# Patient Record
Sex: FEMALE | Employment: FULL TIME
[De-identification: names, ages, dates, MRNs, and addresses within clinical notes are randomized per-mention and may not be internally consistent; named-entity substitution may affect disease eponyms.]

---

## 2023-05-03 DIAGNOSIS — E11.9 TYPE 2 DIABETES MELLITUS WITHOUT COMPLICATION, WITHOUT LONG-TERM CURRENT USE OF INSULIN (HCC): ICD-10-CM

## 2023-05-04 RX ORDER — SITAGLIPTIN AND METFORMIN HYDROCHLORIDE 500; 50 MG/1; MG/1
1 TABLET, FILM COATED ORAL 2 TIMES DAILY WITH MEALS
Qty: 60 TABLET | Refills: 0 | Status: SHIPPED | OUTPATIENT
Start: 2023-05-04 | End: 2023-05-24 | Stop reason: SDUPTHER

## 2023-05-24 ENCOUNTER — OFFICE VISIT (OUTPATIENT)
Facility: CLINIC | Age: 33
End: 2023-05-24
Payer: COMMERCIAL

## 2023-05-24 VITALS
OXYGEN SATURATION: 100 % | RESPIRATION RATE: 16 BRPM | WEIGHT: 272.4 LBS | HEIGHT: 66 IN | BODY MASS INDEX: 43.78 KG/M2 | SYSTOLIC BLOOD PRESSURE: 131 MMHG | DIASTOLIC BLOOD PRESSURE: 82 MMHG | TEMPERATURE: 97.2 F | HEART RATE: 101 BPM

## 2023-05-24 DIAGNOSIS — Z11.59 ENCOUNTER FOR HEPATITIS C SCREENING TEST FOR LOW RISK PATIENT: ICD-10-CM

## 2023-05-24 DIAGNOSIS — Z13.0 SCREENING FOR IRON DEFICIENCY ANEMIA: Primary | ICD-10-CM

## 2023-05-24 DIAGNOSIS — Z23 ENCOUNTER FOR VACCINATION: ICD-10-CM

## 2023-05-24 DIAGNOSIS — J03.91 RECURRENT TONSILLITIS: ICD-10-CM

## 2023-05-24 DIAGNOSIS — E11.9 ENCOUNTER FOR DIABETIC FOOT EXAM (HCC): ICD-10-CM

## 2023-05-24 DIAGNOSIS — E66.01 OBESITY, CLASS III, BMI 40-49.9 (MORBID OBESITY) (HCC): ICD-10-CM

## 2023-05-24 DIAGNOSIS — Z11.4 SCREENING FOR HIV (HUMAN IMMUNODEFICIENCY VIRUS): ICD-10-CM

## 2023-05-24 DIAGNOSIS — J30.9 ALLERGIC RHINITIS, UNSPECIFIED SEASONALITY, UNSPECIFIED TRIGGER: ICD-10-CM

## 2023-05-24 DIAGNOSIS — E11.9 TYPE 2 DIABETES MELLITUS WITHOUT COMPLICATION, WITHOUT LONG-TERM CURRENT USE OF INSULIN (HCC): ICD-10-CM

## 2023-05-24 PROCEDURE — 90677 PCV20 VACCINE IM: CPT | Performed by: INTERNAL MEDICINE

## 2023-05-24 PROCEDURE — 99214 OFFICE O/P EST MOD 30 MIN: CPT | Performed by: INTERNAL MEDICINE

## 2023-05-24 PROCEDURE — 3044F HG A1C LEVEL LT 7.0%: CPT | Performed by: INTERNAL MEDICINE

## 2023-05-24 PROCEDURE — 90715 TDAP VACCINE 7 YRS/> IM: CPT | Performed by: INTERNAL MEDICINE

## 2023-05-24 PROCEDURE — 90471 IMMUNIZATION ADMIN: CPT | Performed by: INTERNAL MEDICINE

## 2023-05-24 PROCEDURE — 90472 IMMUNIZATION ADMIN EACH ADD: CPT | Performed by: INTERNAL MEDICINE

## 2023-05-24 RX ORDER — SITAGLIPTIN AND METFORMIN HYDROCHLORIDE 500; 50 MG/1; MG/1
1 TABLET, FILM COATED ORAL 2 TIMES DAILY WITH MEALS
Qty: 180 TABLET | Refills: 3 | Status: SHIPPED | OUTPATIENT
Start: 2023-05-24

## 2023-05-24 RX ORDER — CETIRIZINE HYDROCHLORIDE 10 MG/1
10 TABLET ORAL DAILY
Qty: 30 TABLET | Refills: 0 | Status: SHIPPED | OUTPATIENT
Start: 2023-05-24 | End: 2023-06-23

## 2023-05-24 ASSESSMENT — ENCOUNTER SYMPTOMS
CONSTIPATION: 0
ABDOMINAL PAIN: 0
COUGH: 0
DIARRHEA: 0
SHORTNESS OF BREATH: 0

## 2023-05-24 NOTE — PROGRESS NOTES
Rex Padgett is a 33 y.o.  female presents today for office visit for   Chief Complaint   Patient presents with    Health Maintenance     1. \"Have you been to the ER, urgent care clinic since your last visit? Hospitalized since your last visit? \" No    2. \"Have you seen or consulted any other health care providers outside of the [address] [place] [address] since your last visit? \" No     3. For patients aged 39-70: Has the patient had a colonoscopy / FIT/ Cologuard? N/A     If the patient is female:    4. For patients aged 41-77: Has the patient had a mammogram within the past 2 years? N/A    5. For patients aged 21-65: Has the patient had a pap smear?  No

## 2023-05-24 NOTE — PROGRESS NOTES
Subjective:      Patient ID: Boyd Addison is a 33 y.o. female. Follow-up    Patient is coming back for diabetes follow-up, she was recently started treatment since she did not tolerate metformin alone. She is tolerating very well Janumet without nausea vomiting or abdominal pain, no diarrhea either. Blood sugars at home are controlled 80s to 100. She lost 2 pounds, she does not skip meals, she has no lightheadedness, dizziness or low energy. We will continue same treatment. Diabetes mellitus type 2  Metformin causing diarrhea. Will start Janumet trial for 1 month and increase as tolerated. Essential hypertension  Lisinopril 10 mg controlled. Obesity  BMI 44. We will explore medications to her losing weight and control diabetes better. Tolerating Janumet. Lost 2 pounds. PAST MEDICAL HISTORY  Medical:  as listed. Surgical: denied. Allergies: denied. Medication: as listed. Family: mom DM-II. Dad DM-II. Work:  and golf course. Social: tobacco denied, [place] socially, recreational drugs denied. Sexual: single, no children, STI denied. Review of Systems   Constitutional:  Negative for chills and fever. HENT:  Negative for sneezing. Respiratory:  Negative for cough and shortness of breath. Cardiovascular:  Negative for chest pain and palpitations. Gastrointestinal:  Negative for abdominal pain, constipation and diarrhea. Objective:   Visit Vitals  /82   Pulse (!) 101   Temp 97.2 °F (36.2 °C) (Temporal)   Resp 16   Ht 5' 6\" (1.676 m)   Wt 272 lb 6.4 oz (123.6 kg)   SpO2 100%   BMI 43.97 kg/m²        Physical Exam  Constitutional:       Appearance: Normal appearance.    HENT:      Right Ear: Hearing, tympanic membrane, ear canal and external ear normal.      Left Ear: Hearing, tympanic membrane, ear canal and external ear normal.      Mouth/Throat:      Mouth: Mucous membranes are moist.   Eyes:      Pupils: Pupils are equal, round, and reactive to

## 2023-05-25 LAB
BASOPHILS # BLD: 1 % (ref 0–2)
BASOPHILS ABSOLUTE: 0.1 K/UL (ref 0–0.2)
CREATININE URINE: 150 MG/DL
EOSINOPHIL # BLD: 1 % (ref 0–6)
EOSINOPHILS ABSOLUTE: 0.1 K/UL (ref 0–0.5)
HCT VFR BLD CALC: 39.5 % (ref 35.1–46.5)
HEMOGLOBIN: 12.4 G/DL (ref 11.7–15.5)
HEPATITIS C ANTIBODY: NORMAL
HIV -1/0/2 AG/AB WITH REFLEX: NON REACTIVE
HIV INTERPRETATION: NORMAL
LYMPHOCYTES # BLD: 28 % (ref 20–45)
LYMPHOCYTES ABSOLUTE: 2.4 K/UL (ref 1–4.8)
MCH RBC QN AUTO: 28 PG (ref 26–34)
MCHC RBC AUTO-ENTMCNC: 31 G/DL (ref 31–36)
MCV RBC AUTO: 88 FL (ref 80–99)
MICROALB/CREAT RATIO (UG/MG CREAT.): 9.1 (ref 0–30)
MICROALBUMIN/CREAT 24H UR: 13.7 MG/L (ref 0.1–17)
MONOCYTES ABSOLUTE: 0.5 K/UL (ref 0.1–1)
MONOCYTES: 6 % (ref 3–12)
NEUTROPHILS ABSOLUTE: 5.4 K/UL (ref 1.8–7.7)
NEUTROPHILS: 63 % (ref 40–75)
PDW BLD-RTO: 14.1 % (ref 10–15.5)
PLATELET # BLD: 337 K/UL (ref 140–440)
PMV BLD AUTO: 11.5 FL (ref 9–13)
RBC: 4.5 M/UL (ref 3.8–5.2)
VITAMIN D 25-HYDROXY: 13.6 NG/ML (ref 32–100)
WBC: 8.6 K/UL (ref 4–11)

## 2023-05-27 DIAGNOSIS — E55.9 VITAMIN D DEFICIENCY: Primary | ICD-10-CM

## 2023-05-27 RX ORDER — MELATONIN
1000 DAILY
Qty: 90 TABLET | Refills: 3 | Status: SHIPPED | OUTPATIENT
Start: 2023-08-20

## 2023-05-27 RX ORDER — ERGOCALCIFEROL 1.25 MG/1
50000 CAPSULE ORAL WEEKLY
Qty: 12 CAPSULE | Refills: 0 | Status: SHIPPED | OUTPATIENT
Start: 2023-05-27 | End: 2023-08-13

## 2023-08-24 ENCOUNTER — OFFICE VISIT (OUTPATIENT)
Facility: CLINIC | Age: 33
End: 2023-08-24
Payer: COMMERCIAL

## 2023-08-24 VITALS
DIASTOLIC BLOOD PRESSURE: 83 MMHG | OXYGEN SATURATION: 98 % | RESPIRATION RATE: 18 BRPM | HEIGHT: 66 IN | HEART RATE: 117 BPM | WEIGHT: 268 LBS | BODY MASS INDEX: 43.07 KG/M2 | SYSTOLIC BLOOD PRESSURE: 138 MMHG

## 2023-08-24 DIAGNOSIS — Z12.4 SCREENING FOR MALIGNANT NEOPLASM OF CERVIX: Primary | ICD-10-CM

## 2023-08-24 DIAGNOSIS — J30.89 NON-SEASONAL ALLERGIC RHINITIS DUE TO OTHER ALLERGIC TRIGGER: ICD-10-CM

## 2023-08-24 DIAGNOSIS — I10 ESSENTIAL HYPERTENSION: ICD-10-CM

## 2023-08-24 DIAGNOSIS — E11.9 TYPE 2 DIABETES MELLITUS WITHOUT COMPLICATION, WITHOUT LONG-TERM CURRENT USE OF INSULIN (HCC): ICD-10-CM

## 2023-08-24 DIAGNOSIS — E66.01 OBESITY, CLASS III, BMI 40-49.9 (MORBID OBESITY) (HCC): ICD-10-CM

## 2023-08-24 PROCEDURE — 3075F SYST BP GE 130 - 139MM HG: CPT | Performed by: INTERNAL MEDICINE

## 2023-08-24 PROCEDURE — 3044F HG A1C LEVEL LT 7.0%: CPT | Performed by: INTERNAL MEDICINE

## 2023-08-24 PROCEDURE — 99213 OFFICE O/P EST LOW 20 MIN: CPT | Performed by: INTERNAL MEDICINE

## 2023-08-24 PROCEDURE — 3079F DIAST BP 80-89 MM HG: CPT | Performed by: INTERNAL MEDICINE

## 2023-08-24 RX ORDER — CETIRIZINE HYDROCHLORIDE 10 MG/1
10 TABLET ORAL DAILY
Qty: 30 TABLET | Refills: 1 | Status: SHIPPED | OUTPATIENT
Start: 2023-08-24 | End: 2023-10-23

## 2023-08-24 RX ORDER — FLUTICASONE PROPIONATE 50 MCG
1 SPRAY, SUSPENSION (ML) NASAL DAILY
Qty: 32 G | Refills: 1 | Status: SHIPPED | OUTPATIENT
Start: 2023-08-24

## 2023-08-24 ASSESSMENT — ENCOUNTER SYMPTOMS
CONSTIPATION: 0
DIARRHEA: 0
SHORTNESS OF BREATH: 0
COUGH: 0
ABDOMINAL PAIN: 0

## 2023-08-24 NOTE — PATIENT INSTRUCTIONS
[facility]  [address] [place] [address] Drive  [phone]

## 2023-08-24 NOTE — PROGRESS NOTES
ROOM # 10  Identified pt with two pt identifiers(name and ). Reviewed record in preparation for visit and have obtained necessary documentation. Chief Complaint   Patient presents with    Gynecologic Exam      Dorina Poole preferred language for health care discussion is english/other. Is the patient using any DME equipment during OV? no    Dorian Poole is due for:  Health Maintenance Due   Topic    COVID-19 Vaccine (1)    Varicella vaccine (1 of 2 - 2-dose childhood series)    Diabetic foot exam     Diabetic Alb to Cr ratio (uACR) test     Diabetic retinal exam     Hepatitis B vaccine (1 of 3 - Risk 3-dose series)    Cervical cancer screen     Flu vaccine (1)       Health Maintenance reviewed and discussed per provider  Please order/place referral if appropriate. 1. For patients aged 43-73: Has the patient had a colonoscopy? no  If the patient is female:    2. For patients aged 43-66: Has the patient had a mammogram within the past 2 years? no    3. For patients aged 21-65: Has the patient had a pap smear? no    Advance Directive:  1. Do you have an advance directive in place? Patient Reply:no    2. If not, would you like material regarding how to put one in place? Patient Reply:no    Coordination of Care:  1. Have you been to the ER, urgent care clinic since your last visit? Hospitalized since your last visit? Patient Reply:no    2. Have you seen or consulted any other health care providers outside of the [address] [place] [address] since your last visit? Include any pap smears or colon. Patient Reply:no    Patient is accompanied by  I have received verbal consent from Dorian Poole to discuss any/all medical information while they are present in the room.     Depression Screening:  PHQ-9  2023   Little interest or pleasure in doing things 0   Feeling down, depressed, or hopeless 0   PHQ-2 Score 0   PHQ-9 Total Score 0           Recent Travel Screening and Travel History documentation     Travel Screening

## 2023-08-26 LAB
CREATININE URINE: 328 MG/DL
MICROALB/CREAT RATIO (UG/MG CREAT.): 9.5 (ref 0–30)
MICROALBUMIN/CREAT 24H UR: 31 MG/L (ref 0.1–17)

## 2023-08-31 LAB
CHLAMYDIA TRACHOMATIS THINPREP: NEGATIVE
NEISSERIA GONORRHOEAE THINPREP: NEGATIVE
PAP IMAGE GUIDED: NORMAL
TRICHOMONAS NUC AMP-THIN PREP: NEGATIVE

## 2023-12-04 ENCOUNTER — OFFICE VISIT (OUTPATIENT)
Facility: CLINIC | Age: 33
End: 2023-12-04
Payer: COMMERCIAL

## 2023-12-04 VITALS
OXYGEN SATURATION: 100 % | RESPIRATION RATE: 18 BRPM | DIASTOLIC BLOOD PRESSURE: 82 MMHG | SYSTOLIC BLOOD PRESSURE: 135 MMHG | HEART RATE: 98 BPM | BODY MASS INDEX: 44.29 KG/M2 | WEIGHT: 275.6 LBS | HEIGHT: 66 IN | TEMPERATURE: 97.5 F

## 2023-12-04 DIAGNOSIS — E11.9 TYPE 2 DIABETES MELLITUS WITHOUT COMPLICATION, WITHOUT LONG-TERM CURRENT USE OF INSULIN (HCC): Primary | ICD-10-CM

## 2023-12-04 DIAGNOSIS — J03.91 RECURRENT TONSILLITIS: ICD-10-CM

## 2023-12-04 PROCEDURE — 99213 OFFICE O/P EST LOW 20 MIN: CPT | Performed by: INTERNAL MEDICINE

## 2023-12-04 PROCEDURE — 3044F HG A1C LEVEL LT 7.0%: CPT | Performed by: INTERNAL MEDICINE

## 2023-12-04 RX ORDER — CETIRIZINE HYDROCHLORIDE 10 MG/1
10 TABLET ORAL DAILY
COMMUNITY
Start: 2023-11-12

## 2023-12-04 RX ORDER — AMOXICILLIN AND CLAVULANATE POTASSIUM 875; 125 MG/1; MG/1
1 TABLET, FILM COATED ORAL 2 TIMES DAILY
COMMUNITY
Start: 2023-11-29

## 2023-12-04 ASSESSMENT — PATIENT HEALTH QUESTIONNAIRE - PHQ9
SUM OF ALL RESPONSES TO PHQ QUESTIONS 1-9: 0
1. LITTLE INTEREST OR PLEASURE IN DOING THINGS: 0
SUM OF ALL RESPONSES TO PHQ QUESTIONS 1-9: 0
SUM OF ALL RESPONSES TO PHQ QUESTIONS 1-9: 0
2. FEELING DOWN, DEPRESSED OR HOPELESS: 0
SUM OF ALL RESPONSES TO PHQ9 QUESTIONS 1 & 2: 0
SUM OF ALL RESPONSES TO PHQ QUESTIONS 1-9: 0

## 2023-12-04 ASSESSMENT — ENCOUNTER SYMPTOMS
SHORTNESS OF BREATH: 0
ABDOMINAL PAIN: 0

## 2023-12-04 NOTE — PROGRESS NOTES
1. \"Have you been to the ER, urgent care clinic since your last visit? Hospitalized since your last visit? \"  [facility]ill[facility]    2. \"Have you seen or consulted any other health care providers outside of the [address] [place] [address] since your last visit? \" No     3. For patients aged 43-73: Has the patient had a colonoscopy / FIT/ Cologuard? NA - based on age      If the patient is female:    4. For patients aged 43-66: Has the patient had a mammogram within the past 2 years? NA - based on age or sex      11. For patients aged 21-65: Has the patient had a pap smear?  Yes - no Care Gap present

## 2023-12-05 LAB
AVERAGE GLUCOSE: 125 MG/DL (ref 91–123)
HBA1C MFR BLD: 6 % (ref 4.8–5.6)

## 2023-12-29 DIAGNOSIS — J30.89 OTHER ALLERGIC RHINITIS: ICD-10-CM

## 2023-12-29 RX ORDER — IBUPROFEN 800 MG/1
TABLET ORAL
COMMUNITY
Start: 2023-11-29

## 2023-12-29 RX ORDER — CETIRIZINE HYDROCHLORIDE 10 MG/1
10 TABLET ORAL DAILY
Qty: 30 TABLET | Refills: 1 | Status: SHIPPED | OUTPATIENT
Start: 2023-12-29

## 2024-01-19 DIAGNOSIS — J30.89 NON-SEASONAL ALLERGIC RHINITIS DUE TO OTHER ALLERGIC TRIGGER: ICD-10-CM

## 2024-01-19 RX ORDER — FLUTICASONE PROPIONATE 50 MCG
1 SPRAY, SUSPENSION (ML) NASAL DAILY
Qty: 3 EACH | Refills: 5 | Status: SHIPPED | OUTPATIENT
Start: 2024-01-19

## 2024-01-19 NOTE — TELEPHONE ENCOUNTER
Last Appointment:  12/4/2023  Future Appointments   Date Time Provider Department Center   3/18/2024  8:30 AM Gianluca Guadalupe MD [facility]

## 2024-03-27 DIAGNOSIS — J30.89 OTHER ALLERGIC RHINITIS: ICD-10-CM

## 2024-03-27 RX ORDER — CETIRIZINE HYDROCHLORIDE 10 MG/1
10 TABLET ORAL DAILY PRN
Qty: 90 TABLET | Refills: 0 | Status: SHIPPED | OUTPATIENT
Start: 2024-03-27

## 2024-03-27 NOTE — TELEPHONE ENCOUNTER
Last Appointment:  12/4/2023  Future Appointments   Date Time Provider Department Center   4/25/2024  8:15 AM Gianluca Guadalupe MD [facility]

## 2024-04-07 DIAGNOSIS — I10 ESSENTIAL HYPERTENSION: ICD-10-CM

## 2024-04-08 RX ORDER — LISINOPRIL 10 MG/1
10 TABLET ORAL DAILY
Qty: 30 TABLET | Refills: 0 | Status: SHIPPED | OUTPATIENT
Start: 2024-04-08

## 2024-04-24 SDOH — ECONOMIC STABILITY: TRANSPORTATION INSECURITY
IN THE PAST 12 MONTHS, HAS LACK OF TRANSPORTATION KEPT YOU FROM MEETINGS, WORK, OR FROM GETTING THINGS NEEDED FOR DAILY LIVING?: NO

## 2024-04-24 SDOH — ECONOMIC STABILITY: FOOD INSECURITY: WITHIN THE PAST 12 MONTHS, YOU WORRIED THAT YOUR FOOD WOULD RUN OUT BEFORE YOU GOT MONEY TO BUY MORE.: PATIENT DECLINED

## 2024-04-24 SDOH — ECONOMIC STABILITY: INCOME INSECURITY: HOW HARD IS IT FOR YOU TO PAY FOR THE VERY BASICS LIKE FOOD, HOUSING, MEDICAL CARE, AND HEATING?: NOT HARD AT ALL

## 2024-04-24 SDOH — ECONOMIC STABILITY: HOUSING INSECURITY
IN THE LAST 12 MONTHS, WAS THERE A TIME WHEN YOU DID NOT HAVE A STEADY PLACE TO SLEEP OR SLEPT IN A SHELTER (INCLUDING NOW)?: NO

## 2024-04-24 SDOH — ECONOMIC STABILITY: FOOD INSECURITY: WITHIN THE PAST 12 MONTHS, THE FOOD YOU BOUGHT JUST DIDN'T LAST AND YOU DIDN'T HAVE MONEY TO GET MORE.: PATIENT DECLINED

## 2024-04-25 ENCOUNTER — OFFICE VISIT (OUTPATIENT)
Facility: CLINIC | Age: 34
End: 2024-04-25
Payer: COMMERCIAL

## 2024-04-25 VITALS
WEIGHT: 281.4 LBS | RESPIRATION RATE: 12 BRPM | HEIGHT: 66 IN | HEART RATE: 105 BPM | SYSTOLIC BLOOD PRESSURE: 131 MMHG | OXYGEN SATURATION: 100 % | BODY MASS INDEX: 45.22 KG/M2 | TEMPERATURE: 97.2 F | DIASTOLIC BLOOD PRESSURE: 77 MMHG

## 2024-04-25 DIAGNOSIS — J30.89 OTHER ALLERGIC RHINITIS: ICD-10-CM

## 2024-04-25 DIAGNOSIS — J30.89 NON-SEASONAL ALLERGIC RHINITIS DUE TO OTHER ALLERGIC TRIGGER: ICD-10-CM

## 2024-04-25 DIAGNOSIS — I10 ESSENTIAL HYPERTENSION: ICD-10-CM

## 2024-04-25 DIAGNOSIS — E55.9 VITAMIN D DEFICIENCY: ICD-10-CM

## 2024-04-25 DIAGNOSIS — E11.9 TYPE 2 DIABETES MELLITUS WITHOUT COMPLICATION, WITHOUT LONG-TERM CURRENT USE OF INSULIN (HCC): ICD-10-CM

## 2024-04-25 LAB
ANION GAP SERPL CALCULATED.3IONS-SCNC: 13 MMOL/L (ref 3–15)
BUN BLDV-MCNC: 10 MG/DL (ref 6–22)
CALCIUM SERPL-MCNC: 9.6 MG/DL (ref 8.4–10.5)
CHLORIDE BLD-SCNC: 100 MMOL/L (ref 98–110)
CO2: 22 MMOL/L (ref 20–32)
CREAT SERPL-MCNC: 0.7 MG/DL (ref 0.5–1.2)
ESTIMATED AVERAGE GLUCOSE: 132 MG/DL (ref 91–123)
GLOMERULAR FILTRATION RATE: >60 ML/MIN/1.73 SQ.M.
GLUCOSE: 123 MG/DL (ref 70–99)
HBA1C MFR BLD: 6.2 % (ref 4.8–5.6)
POTASSIUM SERPL-SCNC: 4.3 MMOL/L (ref 3.5–5.5)
SODIUM BLD-SCNC: 135 MMOL/L (ref 133–145)

## 2024-04-25 PROCEDURE — 3078F DIAST BP <80 MM HG: CPT | Performed by: INTERNAL MEDICINE

## 2024-04-25 PROCEDURE — 99214 OFFICE O/P EST MOD 30 MIN: CPT | Performed by: INTERNAL MEDICINE

## 2024-04-25 PROCEDURE — 3075F SYST BP GE 130 - 139MM HG: CPT | Performed by: INTERNAL MEDICINE

## 2024-04-25 RX ORDER — CETIRIZINE HYDROCHLORIDE 10 MG/1
10 TABLET ORAL DAILY PRN
Qty: 90 TABLET | Refills: 3 | Status: SHIPPED | OUTPATIENT
Start: 2024-04-25

## 2024-04-25 RX ORDER — FLUTICASONE PROPIONATE 50 MCG
1 SPRAY, SUSPENSION (ML) NASAL DAILY PRN
Qty: 3 EACH | Refills: 5 | Status: SHIPPED | OUTPATIENT
Start: 2024-04-25

## 2024-04-25 RX ORDER — LISINOPRIL 10 MG/1
10 TABLET ORAL DAILY
Qty: 90 TABLET | Refills: 1 | Status: SHIPPED | OUTPATIENT
Start: 2024-04-25

## 2024-04-25 RX ORDER — SITAGLIPTIN AND METFORMIN HYDROCHLORIDE 500; 50 MG/1; MG/1
1 TABLET, FILM COATED ORAL 2 TIMES DAILY WITH MEALS
Qty: 180 TABLET | Refills: 1 | Status: SHIPPED | OUTPATIENT
Start: 2024-04-25

## 2024-04-25 RX ORDER — MELATONIN
1000 DAILY
Qty: 90 TABLET | Refills: 1 | Status: SHIPPED | OUTPATIENT
Start: 2024-04-25

## 2024-04-25 ASSESSMENT — ENCOUNTER SYMPTOMS
ABDOMINAL PAIN: 0
SHORTNESS OF BREATH: 0

## 2024-04-25 ASSESSMENT — PATIENT HEALTH QUESTIONNAIRE - PHQ9
SUM OF ALL RESPONSES TO PHQ9 QUESTIONS 1 & 2: 0
SUM OF ALL RESPONSES TO PHQ QUESTIONS 1-9: 0
1. LITTLE INTEREST OR PLEASURE IN DOING THINGS: NOT AT ALL
2. FEELING DOWN, DEPRESSED OR HOPELESS: NOT AT ALL

## 2024-04-25 NOTE — PROGRESS NOTES
\"Have you been to the ER, urgent care clinic since your last visit?  Hospitalized since your last visit?\"    NO    “Have you seen or consulted any other health care providers outside of [facility] since your last visit?”    NO     “Have you had a pap smear?”    YES - Where: Patient had pap smear last year in this office. Nurse/CMA to request most recent records if not in the chart    No cervical cancer screening on file             Click Here for Release of Records Request

## 2024-04-25 NOTE — PROGRESS NOTES
Subjective:      Patient ID: Flip Marvin is a 33 y.o. female.    Follow up    Patient coming for regular check on chronic conditions.  She has been compliant with medications.  Blood pressure is controlled.  She is interested in Ozempic, but she would like to start with physical activity and diet.  Patient was congratulated for trying to change her lifestyle.  She is complaining of seasonal allergies.  Sneezing and runny nose associated with pollen.  No fever chills or sweats.          Diabetes mellitus type 2  Metformin causing diarrhea.   On Janumet well-tolerated.     Essential hypertension  Lisinopril 10 mg.     Obesity  BMI 44.    Lost 6 pounds so far.  Weight loss clinic referral today.    Diabetes  Pertinent negatives for diabetes include no chest pain.   Hypertension  Pertinent negatives include no chest pain or shortness of breath.   Weight Management  Pertinent negatives include no abdominal pain, chest pain, chills or fever.       Review of Systems   Constitutional:  Negative for chills and fever.   Respiratory:  Negative for shortness of breath.    Cardiovascular:  Negative for chest pain.   Gastrointestinal:  Negative for abdominal pain.       Objective:   Visit Vitals  /77 (Site: Left Upper Arm, Position: Sitting, Cuff Size: Large Adult)   Pulse (!) 105   Temp 97.2 °F (36.2 °C) (Temporal)   Resp 12   Ht 1.676 m (5' 6\")   Wt 127.6 kg (281 lb 6.4 oz)   SpO2 100%   BMI 45.42 kg/m²        Physical Exam  Cardiovascular:      Rate and Rhythm: Normal rate and regular rhythm.      Heart sounds: S1 normal and S2 normal.   Pulmonary:      Effort: Pulmonary effort is normal.      Breath sounds: Normal breath sounds.      Comments: No crackles or wheezing  Abdominal:      General: Abdomen is flat. Bowel sounds are normal.   Neurological:      Mental Status: She is alert.         Assessment:       ICD-10-CM    1. Essential hypertension  I10 lisinopril (PRINIVIL;ZESTRIL) 10 MG tablet     Basic Metabolic Panel

## 2024-05-07 ENCOUNTER — TELEPHONE (OUTPATIENT)
Facility: CLINIC | Age: 34
End: 2024-05-07

## 2024-05-07 ENCOUNTER — CLINICAL DOCUMENTATION (OUTPATIENT)
Facility: CLINIC | Age: 34
End: 2024-05-07

## 2024-05-07 DIAGNOSIS — I10 ESSENTIAL HYPERTENSION: Primary | ICD-10-CM

## 2024-05-07 DIAGNOSIS — E11.9 TYPE 2 DIABETES MELLITUS WITHOUT COMPLICATION, WITHOUT LONG-TERM CURRENT USE OF INSULIN (HCC): ICD-10-CM

## 2024-05-07 RX ORDER — NIFEDIPINE 30 MG/1
30 TABLET, EXTENDED RELEASE ORAL DAILY
Qty: 30 TABLET | Refills: 0 | Status: SHIPPED | OUTPATIENT
Start: 2024-05-07 | End: 2024-05-10 | Stop reason: SDUPTHER

## 2024-05-07 RX ORDER — BLOOD-GLUCOSE METER
1 KIT MISCELLANEOUS DAILY
Qty: 1 KIT | Refills: 0 | Status: SHIPPED | OUTPATIENT
Start: 2024-05-07

## 2024-05-07 RX ORDER — LANCETS 30 GAUGE
EACH MISCELLANEOUS
Qty: 100 EACH | Refills: 5 | Status: SHIPPED | OUTPATIENT
Start: 2024-05-07

## 2024-05-07 RX ORDER — GLUCOSAMINE HCL/CHONDROITIN SU 500-400 MG
CAPSULE ORAL
Qty: 100 STRIP | Refills: 5 | Status: SHIPPED | OUTPATIENT
Start: 2024-05-07

## 2024-05-07 RX ORDER — GLIPIZIDE 2.5 MG/1
2.5 TABLET ORAL 2 TIMES DAILY
Qty: 60 TABLET | Refills: 0 | Status: SHIPPED | OUTPATIENT
Start: 2024-05-07 | End: 2024-05-10

## 2024-05-07 NOTE — PROGRESS NOTES
Patient called informed that she found out she was pregnant yesterday.    She stopped lisinopril immediately.  Switched lisinopril to nifedipine, and Janumet to glipizide low-dose to avoid complications with side effects during pregnancy.  Patient understood and agreed with the plan.     Send glucometer.    New prescriptions sent to her pharmacy.  Follow-up appointment with me this Friday.

## 2024-05-07 NOTE — TELEPHONE ENCOUNTER
Pt called in asking if she could get her BP med changed due to her finding out she was pregnant as of yesterday 5/7/24.     Informed Pt that she would need to come in and do a test in office, ect. Next availability was Friday @2:15. Scheduled Pt for now.     However, she is asking if she could come in tmrw or Thursday because she can't take her current meds and she is worried.

## 2024-05-10 ENCOUNTER — OFFICE VISIT (OUTPATIENT)
Facility: CLINIC | Age: 34
End: 2024-05-10
Payer: COMMERCIAL

## 2024-05-10 VITALS
HEART RATE: 99 BPM | TEMPERATURE: 97.3 F | BODY MASS INDEX: 45.42 KG/M2 | DIASTOLIC BLOOD PRESSURE: 84 MMHG | RESPIRATION RATE: 12 BRPM | SYSTOLIC BLOOD PRESSURE: 124 MMHG | OXYGEN SATURATION: 100 % | WEIGHT: 282.6 LBS | HEIGHT: 66 IN

## 2024-05-10 DIAGNOSIS — E11.9 TYPE 2 DIABETES MELLITUS WITHOUT COMPLICATION, WITHOUT LONG-TERM CURRENT USE OF INSULIN (HCC): ICD-10-CM

## 2024-05-10 DIAGNOSIS — I10 ESSENTIAL HYPERTENSION: Primary | ICD-10-CM

## 2024-05-10 PROCEDURE — 3074F SYST BP LT 130 MM HG: CPT | Performed by: INTERNAL MEDICINE

## 2024-05-10 PROCEDURE — 3078F DIAST BP <80 MM HG: CPT | Performed by: INTERNAL MEDICINE

## 2024-05-10 PROCEDURE — 99214 OFFICE O/P EST MOD 30 MIN: CPT | Performed by: INTERNAL MEDICINE

## 2024-05-10 PROCEDURE — 3044F HG A1C LEVEL LT 7.0%: CPT | Performed by: INTERNAL MEDICINE

## 2024-05-10 RX ORDER — NIFEDIPINE 30 MG/1
30 TABLET, EXTENDED RELEASE ORAL DAILY
Qty: 90 TABLET | Refills: 2 | Status: SHIPPED | OUTPATIENT
Start: 2024-05-10

## 2024-05-10 RX ORDER — GLIPIZIDE 5 MG/1
5 TABLET ORAL 2 TIMES DAILY
Qty: 30 TABLET | Refills: 0 | Status: SHIPPED | OUTPATIENT
Start: 2024-05-10 | End: 2024-05-10 | Stop reason: SDUPTHER

## 2024-05-10 RX ORDER — GLIPIZIDE 5 MG/1
5 TABLET ORAL 2 TIMES DAILY
Qty: 60 TABLET | Refills: 0 | Status: SHIPPED | OUTPATIENT
Start: 2024-05-10

## 2024-05-10 ASSESSMENT — PATIENT HEALTH QUESTIONNAIRE - PHQ9
SUM OF ALL RESPONSES TO PHQ QUESTIONS 1-9: 0
SUM OF ALL RESPONSES TO PHQ9 QUESTIONS 1 & 2: 0
SUM OF ALL RESPONSES TO PHQ QUESTIONS 1-9: 0
SUM OF ALL RESPONSES TO PHQ QUESTIONS 1-9: 0
1. LITTLE INTEREST OR PLEASURE IN DOING THINGS: NOT AT ALL
SUM OF ALL RESPONSES TO PHQ QUESTIONS 1-9: 0
2. FEELING DOWN, DEPRESSED OR HOPELESS: NOT AT ALL

## 2024-05-10 ASSESSMENT — ENCOUNTER SYMPTOMS
ABDOMINAL PAIN: 0
SHORTNESS OF BREATH: 0

## 2024-05-10 NOTE — PROGRESS NOTES
Subjective:      Patient ID: Flip Marvin is a 33 y.o. female.    Follow up    Patient has appointment to establish with OB/GYN on 05/24/2024.  Denies fever, chills, sweats, chest pain or shortness of breath, no urinary symptoms.  She is taking the nifedipine in the early afternoon.  Her blood pressure still considering normal range but in the higher side.  Since she is pregnant, I would like to aim to have better blood pressure control.  She reported glucose before breakfast this morning 152.    Increase glipizide to 5 mg bid.  Patient will discuss with OB/GYN management of diabetes in pregnancy.  Blood pressure controlled.  Continue nifedipine 30 mg XL daily.  OB/GYN appt on 05/24/2024        Diabetes mellitus type 2  Stopped Metformin - causing diarrhea.   Stopped (pregnant) - Janumet well-tolerated.  On glipizide 5 mg bid.  Will discuss with OB/GYN management of diabetes in pregnancy.    Essential hypertension  Nifedipine XL 30 mg, daily.     Obesity  BMI 44.    Lost 6 pounds so far.  Weight loss clinic referral today.      Diabetes  Pertinent negatives for diabetes include no chest pain.   Hypertension  Pertinent negatives include no chest pain or shortness of breath.   Weight Management  Pertinent negatives include no abdominal pain, chest pain, chills or fever.       Review of Systems   Constitutional:  Negative for chills and fever.   Respiratory:  Negative for shortness of breath.    Cardiovascular:  Negative for chest pain.   Gastrointestinal:  Negative for abdominal pain.       Objective:   Visit Vitals  /84 (Site: Right Upper Arm, Position: Sitting)   Pulse 99   Temp 97.3 °F (36.3 °C) (Temporal)   Resp 12   Ht 1.676 m (5' 6\")   Wt 128.2 kg (282 lb 9.6 oz)   SpO2 100%   BMI 45.61 kg/m²        Physical Exam  Cardiovascular:      Rate and Rhythm: Normal rate and regular rhythm.   Pulmonary:      Effort: Pulmonary effort is normal.      Breath sounds: Normal breath sounds.   Abdominal:      General:

## 2024-05-10 NOTE — PROGRESS NOTES
\"Have you been to the ER, urgent care clinic since your last visit?  Hospitalized since your last visit?\"    NO    “Have you seen or consulted any other health care providers outside of [facility] since your last visit?”    YES - When: approximately 2  weeks ago.  Where and Why: ENT, Tonsils.     “Have you had a pap smear?”    YES - Where: 08/2023, Patient had it here. Nurse/CMA to request most recent records if not in the chart    No cervical cancer screening on file             Click Here for Release of Records Request

## 2024-06-06 ENCOUNTER — CLINICAL DOCUMENTATION (OUTPATIENT)
Facility: CLINIC | Age: 34
End: 2024-06-06

## 2024-06-06 NOTE — PROGRESS NOTES
Patient called, 2 steps verification.  She talked to OB about diabetes treatment, they have not changed the treatment yet since she is high risk due to diabetes.  She has an appt with OB on Tuesday and they will discuss again diabetes treatment during pregnancy. Patient stated her pregnancy is going well so far, no complaints.

## 2024-06-14 DIAGNOSIS — E11.9 TYPE 2 DIABETES MELLITUS WITHOUT COMPLICATION, WITHOUT LONG-TERM CURRENT USE OF INSULIN (HCC): ICD-10-CM

## 2024-06-14 NOTE — TELEPHONE ENCOUNTER
Last Appointment:  5/10/2024  Future Appointments   Date Time Provider Department Center   9/25/2024  8:30 AM Gianluca Guadalupe MD [facility]

## 2024-06-16 ENCOUNTER — CLINICAL DOCUMENTATION (OUTPATIENT)
Facility: CLINIC | Age: 34
End: 2024-06-16

## 2024-06-16 RX ORDER — GLIPIZIDE 5 MG/1
5 TABLET ORAL 2 TIMES DAILY
Qty: 180 TABLET | Refills: 1 | OUTPATIENT
Start: 2024-06-16

## 2024-09-25 ENCOUNTER — HOSPITAL ENCOUNTER (OUTPATIENT)
Facility: HOSPITAL | Age: 34
Setting detail: SPECIMEN
Discharge: HOME | End: 2024-09-28

## 2024-09-25 ENCOUNTER — OFFICE VISIT (OUTPATIENT)
Facility: CLINIC | Age: 34
End: 2024-09-25

## 2024-09-25 VITALS
OXYGEN SATURATION: 96 % | RESPIRATION RATE: 16 BRPM | SYSTOLIC BLOOD PRESSURE: 131 MMHG | TEMPERATURE: 97.1 F | WEIGHT: 292 LBS | HEART RATE: 104 BPM | BODY MASS INDEX: 46.93 KG/M2 | HEIGHT: 66 IN | DIASTOLIC BLOOD PRESSURE: 72 MMHG

## 2024-09-25 DIAGNOSIS — Z13.0 SCREENING FOR IRON DEFICIENCY ANEMIA: ICD-10-CM

## 2024-09-25 DIAGNOSIS — Z13.6 ENCOUNTER FOR SCREENING FOR CORONARY ARTERY DISEASE: ICD-10-CM

## 2024-09-25 DIAGNOSIS — Z13.228 SCREENING FOR METABOLIC DISORDER: ICD-10-CM

## 2024-09-25 DIAGNOSIS — E55.9 VITAMIN D DEFICIENCY: ICD-10-CM

## 2024-09-25 DIAGNOSIS — I10 ESSENTIAL HYPERTENSION: ICD-10-CM

## 2024-09-25 DIAGNOSIS — E11.9 TYPE 2 DIABETES MELLITUS WITHOUT COMPLICATION, WITHOUT LONG-TERM CURRENT USE OF INSULIN (HCC): ICD-10-CM

## 2024-09-25 DIAGNOSIS — Z13.29 SCREENING FOR THYROID DISORDER: ICD-10-CM

## 2024-09-25 DIAGNOSIS — Z3A.25 25 WEEKS GESTATION OF PREGNANCY: ICD-10-CM

## 2024-09-25 DIAGNOSIS — Z00.00 ANNUAL PHYSICAL EXAM: Primary | ICD-10-CM

## 2024-09-25 LAB
HBA1C MFR BLD: 6.1 %
SENTARA SPECIMEN COLLECTION: NORMAL

## 2024-09-25 PROCEDURE — 99001 SPECIMEN HANDLING PT-LAB: CPT

## 2024-09-25 RX ORDER — INSULIN GLARGINE 100 [IU]/ML
INJECTION, SOLUTION SUBCUTANEOUS 2 TIMES DAILY
COMMUNITY
Start: 2024-08-21

## 2024-09-25 RX ORDER — ONDANSETRON 4 MG/1
4 TABLET, ORALLY DISINTEGRATING ORAL EVERY 6 HOURS PRN
COMMUNITY
Start: 2024-09-10

## 2024-09-25 RX ORDER — PEN NEEDLE, DIABETIC 32GX 5/32"
1 NEEDLE, DISPOSABLE MISCELLANEOUS DAILY
COMMUNITY
Start: 2024-07-03

## 2024-09-25 RX ORDER — NIFEDIPINE 30 MG/1
30 TABLET, EXTENDED RELEASE ORAL 2 TIMES DAILY
Qty: 180 TABLET | Refills: 1 | Status: SHIPPED | OUTPATIENT
Start: 2024-09-25

## 2024-09-25 RX ORDER — CHOLECALCIFEROL (VITAMIN D3) 25 MCG
1000 TABLET ORAL DAILY
Qty: 90 TABLET | Refills: 1 | Status: SHIPPED | OUTPATIENT
Start: 2024-09-25

## 2024-09-25 RX ORDER — BLOOD-GLUCOSE SENSOR
EACH MISCELLANEOUS
COMMUNITY
Start: 2024-08-09

## 2024-09-25 RX ORDER — INSULIN LISPRO 100 [IU]/ML
INJECTION, SOLUTION INTRAVENOUS; SUBCUTANEOUS
COMMUNITY
Start: 2024-09-10

## 2024-09-25 RX ORDER — FLURBIPROFEN SODIUM 0.3 MG/ML
1 SOLUTION/ DROPS OPHTHALMIC DAILY
COMMUNITY
Start: 2024-09-10

## 2024-09-25 ASSESSMENT — ENCOUNTER SYMPTOMS
ABDOMINAL PAIN: 0
SHORTNESS OF BREATH: 0

## 2024-09-26 LAB
A/G RATIO: 1.3 RATIO (ref 1.1–2.6)
ALBUMIN: 3.7 G/DL (ref 3.5–5)
ALP BLD-CCNC: 115 U/L (ref 25–115)
ALT SERPL-CCNC: 26 U/L (ref 5–40)
ANION GAP SERPL CALCULATED.3IONS-SCNC: 12 MMOL/L (ref 3–15)
AST SERPL-CCNC: 16 U/L (ref 10–37)
BACTERIA: PRESENT
BASOPHILS ABSOLUTE: 0.1 K/UL (ref 0–0.2)
BASOPHILS RELATIVE PERCENT: 0 % (ref 0–2)
BILIRUB SERPL-MCNC: 0.3 MG/DL (ref 0.2–1.2)
BILIRUB SERPL-MCNC: NEGATIVE MG/DL
BLOOD: NEGATIVE
BUN BLDV-MCNC: 7 MG/DL (ref 6–22)
CALCIUM SERPL-MCNC: 9.4 MG/DL (ref 8.4–10.5)
CHLORIDE BLD-SCNC: 101 MMOL/L (ref 98–110)
CHOLESTEROL, TOTAL: 224 MG/DL (ref 110–200)
CHOLESTEROL/HDL RATIO: 2.8 (ref 0–5)
CLARITY, UA: CLEAR
CO2: 22 MMOL/L (ref 20–32)
COLOR, UA: YELLOW
CREAT SERPL-MCNC: 0.6 MG/DL (ref 0.5–1.2)
CREATININE URINE: 127 MG/DL
EOSINOPHIL # BLD: 1 % (ref 0–6)
EOSINOPHILS ABSOLUTE: 0.1 K/UL (ref 0–0.5)
EPITHELIAL CELLS: ABNORMAL /HPF
ESTIMATED AVERAGE GLUCOSE: 131 MG/DL (ref 91–123)
GFR, ESTIMATED: >60 ML/MIN/1.73 SQ.M.
GLOBULIN: 2.9 G/DL (ref 2–4)
GLUCOSE: 102 MG/DL (ref 70–99)
GLUCOSE: NEGATIVE MG/DL
HBA1C MFR BLD: 6.2 % (ref 4.8–5.6)
HCT VFR BLD CALC: 41.2 % (ref 35.1–46.5)
HDLC SERPL-MCNC: 80 MG/DL
HEMOGLOBIN: 11.8 G/DL (ref 11.7–15.5)
HYALINE CASTS: ABNORMAL /LPF (ref 0–2)
KETONES, URINE: ABNORMAL MG/DL
LDL CHOLESTEROL: 116 MG/DL (ref 50–99)
LDL/HDL RATIO: 1.5
LEUKOCYTE ESTERASE, URINE: NEGATIVE
LYMPHOCYTES # BLD: 16 % (ref 20–45)
LYMPHOCYTES ABSOLUTE: 2.1 K/UL (ref 1–4.8)
MCH RBC QN AUTO: 25 PG (ref 26–34)
MCHC RBC AUTO-ENTMCNC: 29 G/DL (ref 31–36)
MCV RBC AUTO: 87 FL (ref 80–99)
MICROALB/CREAT RATIO (UG/MG CREAT.): NORMAL
MICROALBUMIN/CREAT 24H UR: <12 MG/L (ref 0.1–17)
MONOCYTES ABSOLUTE: 0.5 K/UL (ref 0.1–1)
MONOCYTES: 4 % (ref 3–12)
NEUTROPHILS ABSOLUTE: 10.6 K/UL (ref 1.8–7.7)
NEUTROPHILS: 79 % (ref 40–75)
NITRITE, URINE: NEGATIVE
NON-HDL CHOLESTEROL: 144 MG/DL
PDW BLD-RTO: 15.2 % (ref 10–15.5)
PH, URINE: 6.5 PH (ref 5–8)
PLATELET # BLD: 314 K/UL (ref 140–440)
PMV BLD AUTO: 12.2 FL (ref 9–13)
POTASSIUM SERPL-SCNC: 4.3 MMOL/L (ref 3.5–5.5)
PROTEIN UA: NEGATIVE MG/DL
RBC # BLD: 4.73 M/UL (ref 3.8–5.2)
RBC URINE: ABNORMAL /HPF
SODIUM BLD-SCNC: 135 MMOL/L (ref 133–145)
SPECIFIC GRAVITY UA: 1.02 (ref 1–1.03)
T4 FREE: 0.9 NG/DL (ref 0.9–1.8)
TOTAL PROTEIN: 6.6 G/DL (ref 6.4–8.3)
TRIGL SERPL-MCNC: 137 MG/DL (ref 40–149)
TSH SERPL DL<=0.05 MIU/L-ACNC: 1.71 MCU/ML (ref 0.27–4.2)
UROBILINOGEN, URINE: 0.2 MG/DL
VLDLC SERPL CALC-MCNC: 27 MG/DL (ref 8–30)
WBC # BLD: 13.3 K/UL (ref 4–11)
WBC UA: ABNORMAL /HPF (ref 0–5)

## 2024-10-13 DIAGNOSIS — R82.90 ABNORMAL URINALYSIS: ICD-10-CM

## 2024-10-13 DIAGNOSIS — D72.829 LEUKOCYTOSIS, UNSPECIFIED TYPE: Primary | ICD-10-CM

## 2024-10-14 ENCOUNTER — CLINICAL DOCUMENTATION (OUTPATIENT)
Facility: CLINIC | Age: 34
End: 2024-10-14

## 2024-10-14 NOTE — PROGRESS NOTES
I called the patient, 2 steps verification.   Informed of all the test results.  Patient denied fever, chills, sweats, chest pain or shortness of breath.  No back pain, GI or urinary symptoms.  Patient is feeling well, without complaints or concerns as usual.  I explained the urine sample seems to be a external contamination and I recommend getting a UA and Urine culture, repeat CBC as well.   She has appointment with OBGYN tomorrow as usual, but she wants to complete these testings in our office.  I recommended the patient to share these lab results with her OB/GYN. She did agreed with me to inform OBGYN about this test results tomorrow during her appointment.

## 2024-10-15 ENCOUNTER — HOSPITAL ENCOUNTER (OUTPATIENT)
Facility: HOSPITAL | Age: 34
Setting detail: SPECIMEN
Discharge: HOME | End: 2024-10-18

## 2024-10-15 LAB — SENTARA SPECIMEN COLLECTION: NORMAL

## 2024-10-15 PROCEDURE — 99001 SPECIMEN HANDLING PT-LAB: CPT

## 2024-10-17 LAB
BACTERIA: PRESENT
BASOPHILS ABSOLUTE: 0 K/UL (ref 0–0.2)
BASOPHILS RELATIVE PERCENT: 0 % (ref 0–2)
BILIRUB SERPL-MCNC: NEGATIVE MG/DL
BLOOD: NEGATIVE
CLARITY, UA: CLEAR
COLOR, UA: YELLOW
EOSINOPHIL # BLD: 1 % (ref 0–6)
EOSINOPHILS ABSOLUTE: 0.1 K/UL (ref 0–0.5)
EPITHELIAL CELLS: ABNORMAL /HPF
GLUCOSE: NEGATIVE MG/DL
HCT VFR BLD CALC: 39.6 % (ref 35.1–46.5)
HEMOGLOBIN: 11.6 G/DL (ref 11.7–15.5)
HYALINE CASTS: ABNORMAL /LPF (ref 0–2)
KETONES, URINE: NEGATIVE MG/DL
LEUKOCYTE ESTERASE, URINE: NEGATIVE
LYMPHOCYTES # BLD: 15 % (ref 20–45)
LYMPHOCYTES ABSOLUTE: 1.9 K/UL (ref 1–4.8)
MCH RBC QN AUTO: 25 PG (ref 26–34)
MCHC RBC AUTO-ENTMCNC: 29 G/DL (ref 31–36)
MCV RBC AUTO: 85 FL (ref 80–99)
MONOCYTES ABSOLUTE: 0.6 K/UL (ref 0.1–1)
MONOCYTES: 5 % (ref 3–12)
NEUTROPHILS ABSOLUTE: 10.5 K/UL (ref 1.8–7.7)
NEUTROPHILS: 79 % (ref 40–75)
NITRITE, URINE: NEGATIVE
PDW BLD-RTO: 15.2 % (ref 10–15.5)
PH, URINE: 6 PH (ref 5–8)
PLATELET # BLD: 275 K/UL (ref 140–440)
PMV BLD AUTO: 11.6 FL (ref 9–13)
PROTEIN UA: NEGATIVE MG/DL
RBC # BLD: 4.64 M/UL (ref 3.8–5.2)
RBC URINE: ABNORMAL /HPF
RESULT: NORMAL
SPECIFIC GRAVITY UA: 1.01 (ref 1–1.03)
UROBILINOGEN, URINE: 0.2 MG/DL
WBC # BLD: 13.3 K/UL (ref 4–11)
WBC UA: ABNORMAL /HPF (ref 0–5)

## 2025-01-23 ENCOUNTER — OFFICE VISIT (OUTPATIENT)
Facility: CLINIC | Age: 35
End: 2025-01-23
Payer: COMMERCIAL

## 2025-01-23 VITALS
TEMPERATURE: 97.3 F | HEIGHT: 66 IN | SYSTOLIC BLOOD PRESSURE: 132 MMHG | RESPIRATION RATE: 17 BRPM | BODY MASS INDEX: 43.81 KG/M2 | HEART RATE: 107 BPM | WEIGHT: 272.6 LBS | OXYGEN SATURATION: 99 % | DIASTOLIC BLOOD PRESSURE: 83 MMHG

## 2025-01-23 DIAGNOSIS — E11.9 TYPE 2 DIABETES MELLITUS WITHOUT COMPLICATION, WITHOUT LONG-TERM CURRENT USE OF INSULIN (HCC): Primary | ICD-10-CM

## 2025-01-23 PROCEDURE — 99214 OFFICE O/P EST MOD 30 MIN: CPT | Performed by: INTERNAL MEDICINE

## 2025-01-23 SDOH — ECONOMIC STABILITY: FOOD INSECURITY: WITHIN THE PAST 12 MONTHS, YOU WORRIED THAT YOUR FOOD WOULD RUN OUT BEFORE YOU GOT MONEY TO BUY MORE.: NEVER TRUE

## 2025-01-23 ASSESSMENT — ENCOUNTER SYMPTOMS
SHORTNESS OF BREATH: 0
ABDOMINAL PAIN: 0

## 2025-01-23 ASSESSMENT — PATIENT HEALTH QUESTIONNAIRE - PHQ9
4. FEELING TIRED OR HAVING LITTLE ENERGY: MORE THAN HALF THE DAYS
SUM OF ALL RESPONSES TO PHQ QUESTIONS 1-9: 4
1. LITTLE INTEREST OR PLEASURE IN DOING THINGS: NOT AT ALL
6. FEELING BAD ABOUT YOURSELF - OR THAT YOU ARE A FAILURE OR HAVE LET YOURSELF OR YOUR FAMILY DOWN: NOT AT ALL
SUM OF ALL RESPONSES TO PHQ QUESTIONS 1-9: 4
3. TROUBLE FALLING OR STAYING ASLEEP: MORE THAN HALF THE DAYS
5. POOR APPETITE OR OVEREATING: NOT AT ALL
SUM OF ALL RESPONSES TO PHQ QUESTIONS 1-9: 4
2. FEELING DOWN, DEPRESSED OR HOPELESS: NOT AT ALL
8. MOVING OR SPEAKING SO SLOWLY THAT OTHER PEOPLE COULD HAVE NOTICED. OR THE OPPOSITE, BEING SO FIGETY OR RESTLESS THAT YOU HAVE BEEN MOVING AROUND A LOT MORE THAN USUAL: NOT AT ALL
SUM OF ALL RESPONSES TO PHQ QUESTIONS 1-9: 4
9. THOUGHTS THAT YOU WOULD BE BETTER OFF DEAD, OR OF HURTING YOURSELF: NOT AT ALL
7. TROUBLE CONCENTRATING ON THINGS, SUCH AS READING THE NEWSPAPER OR WATCHING TELEVISION: NOT AT ALL
SUM OF ALL RESPONSES TO PHQ9 QUESTIONS 1 & 2: 0
10. IF YOU CHECKED OFF ANY PROBLEMS, HOW DIFFICULT HAVE THESE PROBLEMS MADE IT FOR YOU TO DO YOUR WORK, TAKE CARE OF THINGS AT HOME, OR GET ALONG WITH OTHER PEOPLE: NOT DIFFICULT AT ALL

## 2025-01-23 NOTE — PROGRESS NOTES
\"Have you been to the ER, urgent care clinic since your last visit?  Hospitalized since your last visit?\"    YES - When: approximately 4 months ago.  Where and Why: [facility] for pregnancy .    “Have you seen or consulted any other health care providers outside our system since your last visit?”    YES - When: approximately last week on the 17th  weeks ago.  Where and Why: obgyn .     “Have you had a pap smear?”    YES - Where: [facility] Nurse/CMA to request most recent records if not in the chart    No cervical cancer screening on file       “Have you had a diabetic eye exam?”    NO     No diabetic eye exam on file

## 2025-01-23 NOTE — PROGRESS NOTES
Subjective:      Patient ID: Flip Marvin is a 34 y.o. female.    Follow up    Patient is coming for a routine follow up.  She is taking Lantus insulin 10 units daily. She does not want to take insulin any more since she does not like in inject herself and wants to go back to oral treatment.  She was taking up to 20 units Lantus during pregnancy.  She reported sugar 80s to low 200s on Lantus 10 units daily, with an average of 140s.  Patient is not breastfeeding. Her baby is taking 100% formula.  Patient denied fainting or lightheadedness.  Patient has a freestyle continuous monitoring device.  I recommended patient to stop Lantus since she has not wanted take it and to switch to sitagliptin 50 mg daily.  Given that her sugars have been mostly controlled and she required less insulin than before, probably she does not require Janumet (metformin/sitagliptin)  Patient got frustrated from waiting for 45 minutes in the waiting room.  I explained to the patient that are were short staffed today due to weather conditions snowing and ice in the street.  I recommended to continue monitoring sugars multiple times daily at least 3 times per day, and as needed.  I advised her about hypoglycemic events symptoms and signs.  If any to go to ED and contact us.  A1c POC was tried to obtained twice but sample was not suitable.  I ordered CMP and A1c but patient last-minute decided not to complete workup and decided to leave the office since she was feeling frustrated for waiting.  I apologized to the patient for the wait and inconvenience.  I recommended patient to have a short follow-up in no more than 4 weeks.  Patient agreed.   has been informed, she will call the patient to apologize for the waiting and to have the patient coming earlier.  PHQ-9 Total Score: 4 (1/23/2025  9:50 AM)  Thoughts that you would be better off dead, or of hurting yourself in some way: 0 (1/23/2025  9:50 AM)          Postpartum-not

## 2025-01-27 ENCOUNTER — OFFICE VISIT (OUTPATIENT)
Facility: CLINIC | Age: 35
End: 2025-01-27
Payer: COMMERCIAL

## 2025-01-27 ENCOUNTER — HOSPITAL ENCOUNTER (OUTPATIENT)
Facility: HOSPITAL | Age: 35
Setting detail: SPECIMEN
Discharge: HOME | End: 2025-01-30

## 2025-01-27 VITALS
HEART RATE: 98 BPM | BODY MASS INDEX: 44.23 KG/M2 | TEMPERATURE: 97.2 F | OXYGEN SATURATION: 97 % | DIASTOLIC BLOOD PRESSURE: 82 MMHG | SYSTOLIC BLOOD PRESSURE: 136 MMHG | HEIGHT: 66 IN | RESPIRATION RATE: 16 BRPM | WEIGHT: 275.2 LBS

## 2025-01-27 DIAGNOSIS — M65.4 DE QUERVAIN'S TENOSYNOVITIS: Primary | ICD-10-CM

## 2025-01-27 DIAGNOSIS — E11.9 TYPE 2 DIABETES MELLITUS WITHOUT COMPLICATION, WITHOUT LONG-TERM CURRENT USE OF INSULIN (HCC): ICD-10-CM

## 2025-01-27 LAB — SENTARA SPECIMEN COLLECTION: NORMAL

## 2025-01-27 PROCEDURE — 99001 SPECIMEN HANDLING PT-LAB: CPT

## 2025-01-27 PROCEDURE — 99214 OFFICE O/P EST MOD 30 MIN: CPT | Performed by: INTERNAL MEDICINE

## 2025-01-27 RX ORDER — SITAGLIPTIN AND METFORMIN HYDROCHLORIDE 500; 50 MG/1; MG/1
1 TABLET, FILM COATED ORAL 2 TIMES DAILY WITH MEALS
Qty: 60 TABLET | Refills: 2 | Status: SHIPPED | OUTPATIENT
Start: 2025-01-27

## 2025-01-27 RX ORDER — ACYCLOVIR 400 MG/1
TABLET ORAL
Qty: 3 EACH | Refills: 2 | Status: SHIPPED | OUTPATIENT
Start: 2025-01-27

## 2025-01-27 ASSESSMENT — ENCOUNTER SYMPTOMS
ABDOMINAL PAIN: 0
SHORTNESS OF BREATH: 0

## 2025-01-27 NOTE — PROGRESS NOTES
Subjective:      Patient ID: Flip Marvin is a 34 y.o. female.    Follow-up    Patient has been taking Januvia for the last 3 days.  Reported sugar between 120s to 150s.  Denied hypoglycemic events.  Review her Dexcom, showing blood sugars up to 250.  Patient wants to be back in Janumet  mg daily.  Will continue to monitor sugars to avoid hypoglycemic events.  May need to increase to Janumet twice a day depending on sugar control.  She was instructed to contact us if sugars are persistently below 80s.  She is complaining of a left wrist pain for the last 3-4 months.  DeQuervain tenosynovitis left side  Will use the Diclofenac tablet twice a day for 1 or 2 weeks with meals and drinking 70 ounces of water daily.  Voltaren gel as needed up to 4 times a day.  Recommended to use left wrist brace.  If no improvement, will let me know to send referral to Dr. Dumont  Patient is agreeable to plan.          Postpartum-not breast-feeding   Following up OB/GYN.    Diabetes mellitus type 2 -controlled  Stopped insulin, started during pregnancy by OB/GYN.  Stopped Metformin - causing diarrhea.   Stopped (pregnant) - Janumet well-tolerated.  Patient declined continuing insulin since she does not want to inject every day.  Started sitagliptin 50 mg daily on 1/23/2025.  Sugars were still elevated.  Switch to Janumet  mg daily.  Patient will increase to twice a day if sugar allow.  To achieve an sugar level between 90s to 170s.  Avoid hypoglycemic events.    Essential hypertension  Nifedipine XL 30 mg, bid.     Obesity  BMI 44.    Lost 6 pounds so far.  Weight loss clinic referral today.      Wrist Pain   Pertinent negatives include no fever.   Diabetes  Pertinent negatives for diabetes include no chest pain.   Hypertension  Pertinent negatives include no chest pain or shortness of breath.   Weight Management  Pertinent negatives include no abdominal pain, chest pain, chills or fever.       Review of Systems

## 2025-01-27 NOTE — PROGRESS NOTES
\"Have you been to the ER, urgent care clinic since your last visit?  Hospitalized since your last visit?\"    NO    “Have you seen or consulted any other health care providers outside our system since your last visit?”    NO     “Have you had a pap smear?”    yes    No cervical cancer screening on file       “Have you had a diabetic eye exam?”    NO     No diabetic eye exam on file

## 2025-01-28 LAB
CREATININE, URINE  MG/DL: 148 MG/DL
ESTIMATED AVERAGE GLUCOSE: 139 MG/DL (ref 91–123)
HBA1C MFR BLD: 6.5 % (ref 4.8–5.6)
MICROALBUMIN/CREAT 24H UR: 16.7 MG/L (ref 0.1–17)
MICROALBUMIN/CREAT UR-RTO: 11.3 (ref 0–30)

## 2025-01-31 DIAGNOSIS — M65.4 DE QUERVAIN'S TENOSYNOVITIS: ICD-10-CM

## 2025-01-31 DIAGNOSIS — E11.9 TYPE 2 DIABETES MELLITUS WITHOUT COMPLICATION, WITHOUT LONG-TERM CURRENT USE OF INSULIN (HCC): ICD-10-CM

## 2025-02-03 NOTE — TELEPHONE ENCOUNTER
Last Appointment:  1/27/2025  Future Appointments   Date Time Provider Department Center   2/20/2025  8:45 AM Gianluca Guadalupe MD [facility]   3/25/2025  9:15 AM Gianluca Guadalupe MD [facility]   4/28/2025  9:00 AM Gianluca Guadalupe MD [facility]

## 2025-02-04 RX ORDER — ACYCLOVIR 400 MG/1
TABLET ORAL
Qty: 3 EACH | Refills: 2 | Status: SHIPPED | OUTPATIENT
Start: 2025-02-04

## 2025-03-25 ENCOUNTER — OFFICE VISIT (OUTPATIENT)
Facility: CLINIC | Age: 35
End: 2025-03-25
Payer: COMMERCIAL

## 2025-03-25 VITALS
OXYGEN SATURATION: 97 % | WEIGHT: 274 LBS | DIASTOLIC BLOOD PRESSURE: 79 MMHG | HEART RATE: 100 BPM | BODY MASS INDEX: 44.03 KG/M2 | RESPIRATION RATE: 16 BRPM | TEMPERATURE: 97.1 F | SYSTOLIC BLOOD PRESSURE: 127 MMHG | HEIGHT: 66 IN

## 2025-03-25 DIAGNOSIS — E55.9 VITAMIN D DEFICIENCY: ICD-10-CM

## 2025-03-25 DIAGNOSIS — I10 ESSENTIAL HYPERTENSION: ICD-10-CM

## 2025-03-25 DIAGNOSIS — E11.9 TYPE 2 DIABETES MELLITUS WITHOUT COMPLICATION, WITHOUT LONG-TERM CURRENT USE OF INSULIN: ICD-10-CM

## 2025-03-25 DIAGNOSIS — M65.4 DE QUERVAIN'S TENOSYNOVITIS, LEFT: Primary | ICD-10-CM

## 2025-03-25 PROCEDURE — 3074F SYST BP LT 130 MM HG: CPT | Performed by: INTERNAL MEDICINE

## 2025-03-25 PROCEDURE — 3044F HG A1C LEVEL LT 7.0%: CPT | Performed by: INTERNAL MEDICINE

## 2025-03-25 PROCEDURE — 99214 OFFICE O/P EST MOD 30 MIN: CPT | Performed by: INTERNAL MEDICINE

## 2025-03-25 PROCEDURE — 3078F DIAST BP <80 MM HG: CPT | Performed by: INTERNAL MEDICINE

## 2025-03-25 RX ORDER — CHOLECALCIFEROL (VITAMIN D3) 25 MCG
1000 TABLET ORAL DAILY
Qty: 90 TABLET | Refills: 1 | Status: SHIPPED | OUTPATIENT
Start: 2025-03-25

## 2025-03-25 RX ORDER — NIFEDIPINE 30 MG/1
30 TABLET, EXTENDED RELEASE ORAL 2 TIMES DAILY
Qty: 180 TABLET | Refills: 1 | Status: SHIPPED | OUTPATIENT
Start: 2025-03-25

## 2025-03-25 RX ORDER — SITAGLIPTIN AND METFORMIN HYDROCHLORIDE 500; 50 MG/1; MG/1
1 TABLET, FILM COATED ORAL 2 TIMES DAILY WITH MEALS
Qty: 180 TABLET | Refills: 1 | Status: SHIPPED | OUTPATIENT
Start: 2025-03-25

## 2025-03-25 ASSESSMENT — ENCOUNTER SYMPTOMS
SHORTNESS OF BREATH: 0
ABDOMINAL PAIN: 0

## 2025-03-25 NOTE — PROGRESS NOTES
Subjective:      Patient ID: Flip Marvin is a 34 y.o. female.    Follow-up    Patient is coming for routine follow-up on chronic conditions.  She is tolerating well Janumet, denied dizziness lightheadedness.  I advised the patient to check her sugars if she is feeling dizzy or lightheaded.  Patient agreed.  Last A1c was controlled.  Her blood pressure is controlled as well.  She would like to have her Pap smear next appointment along with a physical.  She does not want to go to OB/GYN for Pap smears.  Reviewed CMP from 12/24/2024, kidney function, electrolytes and liver enzymes within normal limits  She has no complaints or concerns today.        Diabetes mellitus type 2 -controlled  Stopped insulin, started during pregnancy by OB/GYN.  Stopped Metformin - causing diarrhea.   Stopped (pregnant) - Janumet well-tolerated.  Patient declined continuing insulin since she does not want to inject every day.  Started sitagliptin 50 mg daily on 1/23/2025.  Sugars were still elevated.  Switch to Janumet  mg daily.  Patient will increase to twice a day if sugar allow.  To achieve an sugar level between 90s to 170s.  Avoid hypoglycemic events.    Essential hypertension  Nifedipine XL 30 mg, bid.     Obesity  BMI 44.    Lost 6 pounds so far.  Weight loss clinic referral today.    Postpartum-not breast-feeding   Following up OB/GYN.    Hypertension  Pertinent negatives include no chest pain or shortness of breath.   Diabetes  Pertinent negatives for diabetes include no chest pain.   Wrist Pain   Pertinent negatives include no fever.   Weight Management  Pertinent negatives include no abdominal pain, chest pain, chills or fever.       Review of Systems   Constitutional:  Negative for chills and fever.   Respiratory:  Negative for shortness of breath.    Cardiovascular:  Negative for chest pain.   Gastrointestinal:  Negative for abdominal pain.       Objective:   Visit Vitals  /79 (BP Cuff Size: Large Adult)   Pulse 100

## 2025-03-25 NOTE — PROGRESS NOTES
\"Have you been to the ER, urgent care clinic since your last visit?  Hospitalized since your last visit?\"    Yes, pink eye    “Have you seen or consulted any other health care providers outside our system since your last visit?”    NO     “Have you had a pap smear?”    NO    No cervical cancer screening on file       “Have you had a diabetic eye exam?”    NO     No diabetic eye exam on file

## 2025-04-11 ENCOUNTER — OFFICE VISIT (OUTPATIENT)
Age: 35
End: 2025-04-11

## 2025-04-11 VITALS — HEIGHT: 66 IN | BODY MASS INDEX: 44.03 KG/M2 | WEIGHT: 274 LBS

## 2025-04-11 DIAGNOSIS — M65.4 DE QUERVAIN'S TENOSYNOVITIS, LEFT: Primary | ICD-10-CM

## 2025-04-11 RX ORDER — LIDOCAINE HYDROCHLORIDE 10 MG/ML
0.5 INJECTION, SOLUTION INFILTRATION; PERINEURAL ONCE
Status: COMPLETED | OUTPATIENT
Start: 2025-04-11 | End: 2025-04-11

## 2025-04-11 RX ADMIN — LIDOCAINE HYDROCHLORIDE 0.5 ML: 10 INJECTION, SOLUTION INFILTRATION; PERINEURAL at 10:23

## 2025-04-11 NOTE — PROGRESS NOTES
and date of birth   * Agreement on procedure being performed was verified  * Risks and Benefits explained to the patient  * Procedure site verified and marked as necessary  * Patient was positioned for comfort  * Consent was signed and verified    Procedure performed by:  Bijan Dumont DO    Patient assisted by: self    How tolerated by patient: tolerated    Post Procedural Pain Scale:0    Comments: none    Procedure:  After consent was obtained, using sterile technique the left wrist was prepped. Local anesthetic used: 1% Lidocaine Kenalog 5 mg and was then injected and the needle withdrawn.  The procedure was well tolerated.  The patient is asked to continue to rest the area for a few more days before resuming regular activities.  It may be more painful for the first 1-2 days.  Watch for fever, or increased swelling or persistent pain in the joint. Call or return to clinic prn if such symptoms occur or there is failure to improve as anticipated.     Bijan Dumont DO  4/11/2025 9:55 AM    Note: This note was completed using voice recognition software.  Any typographical/name errors or mistakes are unintentional.

## 2025-06-03 DIAGNOSIS — J30.89 OTHER ALLERGIC RHINITIS: ICD-10-CM

## 2025-06-03 DIAGNOSIS — E55.9 VITAMIN D DEFICIENCY: ICD-10-CM

## 2025-06-03 NOTE — TELEPHONE ENCOUNTER
Ms. Marvin is requesting refills of:    Requested Prescriptions     Pending Prescriptions Disp Refills    cetirizine (ZYRTEC) 10 MG tablet 90 tablet 3     Sig: Take 1 tablet by mouth daily as needed for Allergies or Rhinitis    vitamin D3 (CHOLECALCIFEROL) 25 MCG (1000 UT) TABS tablet 90 tablet 1     Sig: Take 1 tablet by mouth daily         to be sent to   [facility]/pharmacy #[id number] [place], [place] - [address] - P [phone] - F [phone]  [address] [facility] [address]  Phone: [phone] Fax: [phone]    [facility]/pharmacy #[id number] [place], [place] - [address] [facility] - P [phone] - F [phone]  [address] [facility] [facility] [address]  Phone: [phone] Fax: [phone]  .     LAST OFFICE VISIT:  3/25/2025     UPCOMING APPOINTMENT(S):  Future Appointments   Date Time Provider Department Center   9/25/2025  9:30 AM Gianluca Guadalupe MD [facility]           Provided notified

## 2025-06-04 RX ORDER — CHOLECALCIFEROL (VITAMIN D3) 25 MCG
1000 TABLET ORAL DAILY
Qty: 90 TABLET | Refills: 0 | Status: SHIPPED | OUTPATIENT
Start: 2025-06-04

## 2025-06-04 RX ORDER — CETIRIZINE HYDROCHLORIDE 10 MG/1
10 TABLET ORAL DAILY PRN
Qty: 90 TABLET | Refills: 0 | Status: SHIPPED | OUTPATIENT
Start: 2025-06-04

## 2025-08-07 ENCOUNTER — OFFICE VISIT (OUTPATIENT)
Facility: CLINIC | Age: 35
End: 2025-08-07

## 2025-08-07 VITALS
WEIGHT: 272.2 LBS | OXYGEN SATURATION: 96 % | HEART RATE: 111 BPM | SYSTOLIC BLOOD PRESSURE: 112 MMHG | BODY MASS INDEX: 43.75 KG/M2 | RESPIRATION RATE: 17 BRPM | HEIGHT: 66 IN | DIASTOLIC BLOOD PRESSURE: 76 MMHG | TEMPERATURE: 97.2 F

## 2025-08-07 DIAGNOSIS — D64.9 ANEMIA, UNSPECIFIED TYPE: ICD-10-CM

## 2025-08-07 DIAGNOSIS — I10 ESSENTIAL HYPERTENSION: ICD-10-CM

## 2025-08-07 DIAGNOSIS — E11.9 TYPE 2 DIABETES MELLITUS WITHOUT COMPLICATION, WITHOUT LONG-TERM CURRENT USE OF INSULIN (HCC): Primary | ICD-10-CM

## 2025-08-07 DIAGNOSIS — Z78.9 USES BIRTH CONTROL: ICD-10-CM

## 2025-08-07 LAB — HBA1C MFR BLD: 6.5 %

## 2025-08-07 RX ORDER — SITAGLIPTIN AND METFORMIN HYDROCHLORIDE 500; 50 MG/1; MG/1
1 TABLET, FILM COATED ORAL 2 TIMES DAILY WITH MEALS
Qty: 180 TABLET | Refills: 1 | Status: SHIPPED | OUTPATIENT
Start: 2025-08-07

## 2025-08-07 RX ORDER — NIFEDIPINE 30 MG/1
30 TABLET, EXTENDED RELEASE ORAL 2 TIMES DAILY
Qty: 180 TABLET | Refills: 1 | Status: SHIPPED | OUTPATIENT
Start: 2025-08-07

## 2025-08-07 RX ORDER — NORETHINDRONE ACETATE AND ETHINYL ESTRADIOL 1MG-20(21)
1 KIT ORAL DAILY
Qty: 1 PACKET | Refills: 5 | Status: SHIPPED | OUTPATIENT
Start: 2025-08-07

## 2025-08-07 SDOH — ECONOMIC STABILITY: FOOD INSECURITY: WITHIN THE PAST 12 MONTHS, THE FOOD YOU BOUGHT JUST DIDN'T LAST AND YOU DIDN'T HAVE MONEY TO GET MORE.: NEVER TRUE

## 2025-08-07 SDOH — ECONOMIC STABILITY: FOOD INSECURITY: WITHIN THE PAST 12 MONTHS, YOU WORRIED THAT YOUR FOOD WOULD RUN OUT BEFORE YOU GOT MONEY TO BUY MORE.: NEVER TRUE

## 2025-08-07 ASSESSMENT — ENCOUNTER SYMPTOMS
SHORTNESS OF BREATH: 0
ABDOMINAL PAIN: 0

## 2025-08-08 LAB
ALBUMIN SERPL-MCNC: 4.2 G/DL (ref 3.9–4.9)
ALP SERPL-CCNC: 85 IU/L (ref 44–121)
ALT SERPL-CCNC: 13 IU/L (ref 0–32)
AST SERPL-CCNC: 11 IU/L (ref 0–40)
BASOPHILS # BLD AUTO: 0 X10E3/UL (ref 0–0.2)
BASOPHILS NFR BLD AUTO: 0 %
BILIRUB SERPL-MCNC: 0.4 MG/DL (ref 0–1.2)
BUN SERPL-MCNC: 7 MG/DL (ref 6–20)
BUN/CREAT SERPL: 9 (ref 9–23)
CALCIUM SERPL-MCNC: 9.7 MG/DL (ref 8.7–10.2)
CHLORIDE SERPL-SCNC: 101 MMOL/L (ref 96–106)
CO2 SERPL-SCNC: 19 MMOL/L (ref 20–29)
CREAT SERPL-MCNC: 0.77 MG/DL (ref 0.57–1)
EGFRCR SERPLBLD CKD-EPI 2021: 104 ML/MIN/1.73
EOSINOPHIL # BLD AUTO: 0.1 X10E3/UL (ref 0–0.4)
EOSINOPHIL NFR BLD AUTO: 1 %
ERYTHROCYTE [DISTWIDTH] IN BLOOD BY AUTOMATED COUNT: 12.9 % (ref 11.7–15.4)
FERRITIN SERPL-MCNC: 56 NG/ML (ref 15–150)
GLOBULIN SER CALC-MCNC: 3 G/DL (ref 1.5–4.5)
GLUCOSE SERPL-MCNC: 124 MG/DL (ref 70–99)
HCT VFR BLD AUTO: 42.7 % (ref 34–46.6)
HGB BLD-MCNC: 13.5 G/DL (ref 11.1–15.9)
IMM GRANULOCYTES # BLD AUTO: 0 X10E3/UL (ref 0–0.1)
IMM GRANULOCYTES NFR BLD AUTO: 0 %
IRON SATN MFR SERPL: 18 % (ref 15–55)
IRON SERPL-MCNC: 72 UG/DL (ref 27–159)
LYMPHOCYTES # BLD AUTO: 2 X10E3/UL (ref 0.7–3.1)
LYMPHOCYTES NFR BLD AUTO: 20 %
MCH RBC QN AUTO: 26.4 PG (ref 26.6–33)
MCHC RBC AUTO-ENTMCNC: 31.6 G/DL (ref 31.5–35.7)
MCV RBC AUTO: 84 FL (ref 79–97)
MONOCYTES # BLD AUTO: 0.5 X10E3/UL (ref 0.1–0.9)
MONOCYTES NFR BLD AUTO: 5 %
NEUTROPHILS # BLD AUTO: 7.3 X10E3/UL (ref 1.4–7)
NEUTROPHILS NFR BLD AUTO: 74 %
PLATELET # BLD AUTO: 320 X10E3/UL (ref 150–450)
POTASSIUM SERPL-SCNC: 4.3 MMOL/L (ref 3.5–5.2)
PROT SERPL-MCNC: 7.2 G/DL (ref 6–8.5)
RBC # BLD AUTO: 5.11 X10E6/UL (ref 3.77–5.28)
SODIUM SERPL-SCNC: 137 MMOL/L (ref 134–144)
TIBC SERPL-MCNC: 410 UG/DL (ref 250–450)
UIBC SERPL-MCNC: 338 UG/DL (ref 131–425)
WBC # BLD AUTO: 10 X10E3/UL (ref 3.4–10.8)

## 2025-08-29 ENCOUNTER — OFFICE VISIT (OUTPATIENT)
Age: 35
End: 2025-08-29

## 2025-08-29 VITALS — HEIGHT: 66 IN | WEIGHT: 270.3 LBS | BODY MASS INDEX: 43.44 KG/M2

## 2025-08-29 DIAGNOSIS — M65.4 DE QUERVAIN'S TENOSYNOVITIS, LEFT: Primary | ICD-10-CM

## 2025-08-29 RX ORDER — TRIAMCINOLONE ACETONIDE 10 MG/ML
5 INJECTION, SUSPENSION INTRA-ARTICULAR; INTRALESIONAL ONCE
Status: COMPLETED | OUTPATIENT
Start: 2025-08-29 | End: 2025-08-29

## 2025-08-29 RX ORDER — LIDOCAINE HYDROCHLORIDE 10 MG/ML
0.5 INJECTION, SOLUTION INFILTRATION; PERINEURAL ONCE
Status: COMPLETED | OUTPATIENT
Start: 2025-08-29 | End: 2025-08-29

## 2025-08-29 RX ADMIN — LIDOCAINE HYDROCHLORIDE 0.5 ML: 10 INJECTION, SOLUTION INFILTRATION; PERINEURAL at 08:48

## 2025-08-29 RX ADMIN — TRIAMCINOLONE ACETONIDE 5 MG: 10 INJECTION, SUSPENSION INTRA-ARTICULAR; INTRALESIONAL at 08:48

## 2025-08-30 DIAGNOSIS — J30.89 OTHER ALLERGIC RHINITIS: ICD-10-CM

## 2025-09-03 RX ORDER — CETIRIZINE HYDROCHLORIDE 10 MG/1
10 TABLET ORAL DAILY PRN
Qty: 90 TABLET | Refills: 1 | Status: SHIPPED | OUTPATIENT
Start: 2025-09-03